# Patient Record
(demographics unavailable — no encounter records)

---

## 2025-02-02 NOTE — HISTORY OF PRESENT ILLNESS
Quality 110: Preventive Care And Screening: Influenza Immunization: Influenza Immunization Administered during Influenza season [Patient reported PAP Smear was normal] : Patient reported PAP Smear was normal Detail Level: Detailed [HIV Test offered] : HIV Test offered [Syphilis test offered] : Syphilis test offered [Gonorrhea test offered] : Gonorrhea test offered [Chlamydia test offered] : Chlamydia test offered [Trichomonas test offered] : Trichomonas test offered [HPV test offered] : HPV test offered [Hepatitis B test offered] : Hepatitis B test offered [Hepatitis C test offered] : Hepatitis C test offered [Y] : Patient is sexually active [Localized] : localized [Currently Active] : currently active [Men] : men [Vaginal] : vaginal [No] : No [N] : Patient denies prior pregnancies [Oral Contraceptive] : uses oral contraception pills [Normal Amount/Duration] :  normal amount and duration [Regular Cycle Intervals] : periods have been regular [Menarche Age: ____] : age at menarche was [unfilled] [FreeTextEntry1] : LORETA KAISER is a 24 year y/o female G0 who presents for pelvic pain and associated with right groin mass. The mass has presented 4 times with the first time in November 2023. An ultrasound determined it to be a  cystic lesion with few internal septations and no internal vascularity which resolved a week and a half after onset on its own. Currently more painful to the touch and area has swelling. Pt reports 10 pound weight loss since beginning of summer, no chills or night sweats. [PapSmeardate] : 01/2022 [LMPDate] : 01/13/2024 [MensesFreq] : 4 [PGHxTotal] : 0 [Menses] : not menses [Deal] : not intercourse [Urination] : not urination [Bowel Movement] : not bowel movement [TextBox_7] : on bikini line  [TextBox_13] : started 5 days ago  [FreeTextEntry3] : contraceptive pills

## 2025-02-02 NOTE — HISTORY OF PRESENT ILLNESS
[Patient reported PAP Smear was normal] : Patient reported PAP Smear was normal [HIV Test offered] : HIV Test offered [Syphilis test offered] : Syphilis test offered [Gonorrhea test offered] : Gonorrhea test offered [Chlamydia test offered] : Chlamydia test offered [Trichomonas test offered] : Trichomonas test offered [HPV test offered] : HPV test offered [Hepatitis B test offered] : Hepatitis B test offered [Hepatitis C test offered] : Hepatitis C test offered [Y] : Patient is sexually active [Localized] : localized [Currently Active] : currently active [Men] : men [Vaginal] : vaginal [No] : No [N] : Patient denies prior pregnancies [Oral Contraceptive] : uses oral contraception pills [Normal Amount/Duration] :  normal amount and duration [Regular Cycle Intervals] : periods have been regular [Menarche Age: ____] : age at menarche was [unfilled] [FreeTextEntry1] : LORETA KAISER is a 24 year y/o female G0 who presents for pelvic pain and associated with right groin mass. The mass has presented 4 times with the first time in November 2023. An ultrasound determined it to be a  cystic lesion with few internal septations and no internal vascularity which resolved a week and a half after onset on its own. Currently more painful to the touch and area has swelling. Pt reports 10 pound weight loss since beginning of summer, no chills or night sweats. [PapSmeardate] : 01/2022 [LMPDate] : 01/13/2024 [MensesFreq] : 4 [PGHxTotal] : 0 [Menses] : not menses [[place]] : not intercourse [Urination] : not urination [Bowel Movement] : not bowel movement [TextBox_7] : on bikini line  [TextBox_13] : started 5 days ago  [FreeTextEntry3] : contraceptive pills

## 2025-02-02 NOTE — PHYSICAL EXAM
[Appropriately responsive] : appropriately responsive [Alert] : alert [No Acute Distress] : no acute distress [Oriented x3] : oriented x3 [FreeTextEntry7] : RLQ inguinal 3cm deep, mobile tender mass. No erythema, skin changes, or drainage

## 2025-02-20 NOTE — HISTORY OF PRESENT ILLNESS
[FreeTextEntry1] : 24F referred by GYN for a symptomatic right canal of nuck cyst. Pt reports she has had this right inguinal swelling for more than year. States when it initially started she would have swelling and pain and would resolve w/in a week. Wouldn't get another flare for a few months. Now reports it wax and wanes on a weekly basis. Last time it was really swollen was 1 week ago. Today it has reduced but does feel tenderness to area and now experiencing some localized numbness. Patient has tried warm and cold compress but has not seen any relief.   PMH: Denies Meds: OCPs All: NKDA PSH: [place] teeth removal FH: Patient is adopted, unaware of family hx.  Denies OB hx Cscope:  Never been done  CT Pelvis w/ and w/o contrast 02/07/2025 Impression: 3.2 x 2.4 x 2.1 cm cyst at the right canal of Nuck.

## 2025-02-20 NOTE — PHYSICAL EXAM
[JVD] : no jugular venous distention  [Normal Breath Sounds] : Normal breath sounds [No Rash or Lesion] : No rash or lesion [Alert] : alert [Calm] : calm [de-identified] : Soft, nontender, nondistended. Right groin bulge without overlying skin changes. Mildly tender to touch without fluctuance. Bulging with valsalva.  [de-identified] : Well appearing thin female in NAD [de-identified] : MMM [de-identified] : ROM WNL

## 2025-03-03 NOTE — HISTORY OF PRESENT ILLNESS
[de-identified] : Patient is a 24 year old woman who presents for evaluation of symptomatic right cyst of canal of Nuck. She reports several month history of intermittent right groin pain associated with bulge. She underwent imaging that demonstrated of right sided Canal of nuck cyst. Denies any obstructive or obstipation symptoms. No fevers, chills, chest pain, dyspnea, dysuria, hematochezia, melena. At time of evaluation, afebrile, hemodynamically stable, abdomen soft, nontender, nondistended, right groin bulge appreciated consistent with canal of Nuck cyst.

## 2025-03-03 NOTE — ASSESSMENT
[FreeTextEntry1] : Patient is a 24 year old woman who presents for evaluation of symptomatic right cyst of canal of Nuck. She reports several month history of intermittent right groin pain associated with bulge. She underwent imaging that demonstrated of right sided Canal of nuck cyst. Denies any obstructive or obstipation symptoms. No fevers, chills, chest pain, dyspnea, dysuria, hematochezia, melena. At time of evaluation, afebrile, hemodynamically stable, abdomen soft, nontender, nondistended, right groin bulge appreciated consistent with canal of Nuck cyst.  In summary, Ms. LORETA KAISER has a symptomatic inguinal hernia for which I recommend surgical repair at the patient's earliest convenience. I discussed the risks and benefits including, but not limited to, injury to intra-abdominal organs (bowel, bladder, nerves, spermatic cord/round ligament), bleeding, infection, use of mesh and recurrent hernia, and urinary retention. We discussed the role and complications of mesh at length. I also discussed the normal navi- and post-operative course and the possibility of postoperative chronic pain. I answered all questions about this surgery and possible complications to the best of my ability and the patient verbalized understanding. Should unrelenting symptoms or signs of incarceration develop prior to surgery the patient knows to call or go to the emergency room.  Plan for robotic assisted right inguinal hernia repair with mesh, canal of Nuck cyst  I, Dr. Ian Paez, spent 50 minutes with the patient >50% counseling/coordination of care including, reviewing the patient's history, performing an examination, reviewing relevant labs and radiographic imaging, reviewing PCP and consultant notes, discussion of medical and surgical management of the diagnosis as well as associated risks and benefits, and completing documentation.

## 2025-03-30 NOTE — END OF VISIT
[] : Resident [FreeTextEntry3] : Healthy 23y/o woman. Exercises regularly and no anginal symptoms. She is considered low risk for a low/intermediate risk procedure.

## 2025-03-30 NOTE — END OF VISIT
[] : Resident [FreeTextEntry3] : Healthy 25y/o woman. Exercises regularly and no anginal symptoms. She is considered low risk for a low/intermediate risk procedure.

## 2025-03-30 NOTE — HISTORY OF PRESENT ILLNESS
[No Pertinent Cardiac History] : no history of aortic stenosis, atrial fibrillation, coronary artery disease, recent myocardial infarction, or implantable device/pacemaker [No Pertinent Pulmonary History] : no history of asthma, COPD, sleep apnea, or smoking [No Adverse Anesthesia Reaction] : no adverse anesthesia reaction in self or family member [(Patient denies any chest pain, claudication, dyspnea on exertion, orthopnea, palpitations or syncope)] : Patient denies any chest pain, claudication, dyspnea on exertion, orthopnea, palpitations or syncope [Good (7-10 METs)] : Good (7-10 METs) [Chronic Anticoagulation] : no chronic anticoagulation [Chronic Kidney Disease] : no chronic kidney disease [Diabetes] : no diabetes [FreeTextEntry1] : [facility]- RIGHT INGUINAL HERNIA REPAIR WITH MESH [FreeTextEntry2] : 4/10/25 [FreeTextEntry3] : Dr. Paez [FreeTextEntry4] : Ms. LORETA KAISER is a 24-year-old female with history of Lichen sclerosis and right canal of nuck cyst presenting today to the [facility] prior to robotic inguinal hernia repair with mesh that is scheduled with Dr. Paez on 4/10/25 at 8AM. Pt reports pain to R inguinal region due to cyst, pain has worsened within the last week and pt was unable to sleep last night. Has been taking Tylenol 1g BID and Ibuprofen 400mg BID with minimal relief of symptoms. [FreeTextEntry8] : Rock climbing 3x/week

## 2025-03-30 NOTE — ASSESSMENT
[Patient Optimized for Surgery] : Patient optimized for surgery [FreeTextEntry4] : Ms. LORETA KAISER is a 24-year-old female with history of Lichen sclerosus and right canal of nuck cyst presenting today to the [facility] prior to robotic inguinal hernia repair with mesh that is scheduled with Dr. Paez on 4/10/25 at 8AM. METS score 7-10. EKG with NSR, no acute ischemic changes noted. Pt is deemed low risk for a low-moderate risk procedure. Pending lab results.

## 2025-03-30 NOTE — ASSESSMENT
[Patient Optimized for Surgery] : Patient optimized for surgery [FreeTextEntry4] : Ms. LORETA KAISER is a 24-year-old female with history of Lichen sclerosus and right canal of nuck cyst presenting today to the [facility] prior to robotic inguinal hernia repair with mesh that is scheduled with Dr. Paez on 4/10/25 at 8AM. METS score 7-10. EKG with NSR, no acute ischemic changes noted. Pt is deemed low risk for a low-moderate risk procedure. Pending lab results.

## 2025-03-30 NOTE — PHYSICAL EXAM
[No Acute Distress] : no acute distress [Well Nourished] : well nourished [Well Developed] : well developed [PERRL] : pupils equal round and reactive to light [EOMI] : extraocular movements intact [Normal Outer Ear/Nose] : the outer ears and nose were normal in appearance [Normal Oropharynx] : the oropharynx was normal [Supple] : supple [No Respiratory Distress] : no respiratory distress  [No Accessory Muscle Use] : no accessory muscle use [Clear to Auscultation] : lungs were clear to auscultation bilaterally [Normal Rate] : normal rate  [Regular Rhythm] : with a regular rhythm [Normal S1, S2] : normal S1 and S2 [Soft] : abdomen soft [Non Tender] : non-tender [Non-distended] : non-distended [Normal Bowel Sounds] : normal bowel sounds [No Joint Swelling] : no joint swelling [Grossly Normal Strength/Tone] : grossly normal strength/tone [No Focal Deficits] : no focal deficits [Normal Gait] : normal gait [de-identified] : + R inguinal cyst noted with tenderness

## 2025-03-30 NOTE — REVIEW OF SYSTEMS
[Nasal Discharge] : nasal discharge [Fever] : no fever [Chills] : no chills [Fatigue] : no fatigue [Discharge] : no discharge [Pain] : no pain [Vision Problems] : no vision problems [Earache] : no earache [Sore Throat] : no sore throat [Postnasal Drip] : no postnasal drip [Palpitations] : no palpitations [Lower Ext Edema] : no lower extremity edema [Shortness Of Breath] : no shortness of breath [Wheezing] : no wheezing [Cough] : no cough [Abdominal Pain] : no abdominal pain [Nausea] : no nausea [Diarrhea] : diarrhea [Vomiting] : no vomiting [Melena] : no melena [Dysuria] : no dysuria [Hematuria] : no hematuria [Frequency] : no frequency [Headache] : no headache [Dizziness] : no dizziness

## 2025-03-30 NOTE — PHYSICAL EXAM
[No Acute Distress] : no acute distress [Well Nourished] : well nourished [Well Developed] : well developed [PERRL] : pupils equal round and reactive to light [EOMI] : extraocular movements intact [Normal Outer Ear/Nose] : the outer ears and nose were normal in appearance [Normal Oropharynx] : the oropharynx was normal [Supple] : supple [No Respiratory Distress] : no respiratory distress  [No Accessory Muscle Use] : no accessory muscle use [Clear to Auscultation] : lungs were clear to auscultation bilaterally [Normal Rate] : normal rate  [Regular Rhythm] : with a regular rhythm [Normal S1, S2] : normal S1 and S2 [Soft] : abdomen soft [Non Tender] : non-tender [Non-distended] : non-distended [Normal Bowel Sounds] : normal bowel sounds [No Joint Swelling] : no joint swelling [Grossly Normal Strength/Tone] : grossly normal strength/tone [No Focal Deficits] : no focal deficits [Normal Gait] : normal gait [de-identified] : + R inguinal cyst noted with tenderness

## 2025-03-30 NOTE — HISTORY OF PRESENT ILLNESS
[No Pertinent Cardiac History] : no history of aortic stenosis, atrial fibrillation, coronary artery disease, recent myocardial infarction, or implantable device/pacemaker [No Pertinent Pulmonary History] : no history of asthma, COPD, sleep apnea, or smoking [No Adverse Anesthesia Reaction] : no adverse anesthesia reaction in self or family member [(Patient denies any chest pain, claudication, dyspnea on exertion, orthopnea, palpitations or syncope)] : Patient denies any chest pain, claudication, dyspnea on exertion, orthopnea, palpitations or syncope [Good (7-10 METs)] : Good (7-10 METs) [Chronic Anticoagulation] : no chronic anticoagulation [Chronic Kidney Disease] : no chronic kidney disease [Diabetes] : no diabetes [FreeTextEntry2] : 4/10/25 [FreeTextEntry1] : [facility]- RIGHT INGUINAL HERNIA REPAIR WITH MESH [FreeTextEntry3] : Dr. Paez [FreeTextEntry4] : Ms. LORETA KAISER is a 24-year-old female with history of Lichen sclerosis and right canal of nuck cyst presenting today to the [facility] prior to robotic inguinal hernia repair with mesh that is scheduled with Dr. Paez on 4/10/25 at 8AM. Pt reports pain to R inguinal region due to cyst, pain has worsened within the last week and pt was unable to sleep last night. Has been taking Tylenol 1g BID and Ibuprofen 400mg BID with minimal relief of symptoms. [FreeTextEntry8] : Rock climbing 3x/week

## 2025-04-21 NOTE — PHYSICAL EXAM
[de-identified] : incisions healing well, dermabond intact. no evidence of bleeding, oozing, or infection

## 2025-04-21 NOTE — HISTORY OF PRESENT ILLNESS
[de-identified] : Pt is a 23 y/o F 2 weeks s/p repair of right inguinal hernia for treatment of right sided cyst of canal of nuck who presents today feeling well here for post op care.  Pt denies any fevers, chest pn, sob, calf pain, n,v,c,d, or abd pn since sx. Pt notes right groin numbness which she states she also experienced preop. Will reassess post op status again in 1 mo. Pt reports otherwise resuming her usual diet and home activities without difficulty and has been walking daily for exercise. Pt understands we do not recommend lifting over 10 lbs x 6 weeks post op.

## 2025-04-21 NOTE — ASSESSMENT
[FreeTextEntry1] : Pt is a 23 y/o F 2 weeks s/p repair of right inguinal hernia for treatment of right sided cyst of canal of nuck who presents today feeling well here for post op care.  Pt denies any fevers, chest pn, sob, calf pain, n,v,c,d, or abd pn since sx. Pt notes right groin numbness which she states she also experienced preop. Will reassess post op status again in 1 mo. Pt reports otherwise resuming her usual diet and home activities without difficulty and has been walking daily for exercise. Pt understands we do not recommend lifting over 10 lbs x 6 weeks post op.  I, Dr. Ian Paez, personally performed the evaluation and management (E/M) services for this patient. That E/M includes conducting the clinically appropriate initial history &/or exam, assessing all conditions, and establishing the plan of care. Today, my PA, Rosie Byrne, was present during my evaluation and management service for this patient and assisted in scribing the encounter and was here to observe my E/M service for this patient and follow plan of care established by me going forward.

## 2025-05-19 NOTE — HISTORY OF PRESENT ILLNESS
[de-identified] : Pt is a 23 y/o F 6 weeks s/p right inguinal hernia repair for cyst of canal of nuck who presents today feeling well here for post op care.  Pt denies any fevers, chest pn, sob, calf pain, n,v,c,d, or abd pn since sx. Pt reports feeling much better overall and her pain has completely resolved. Pt states she has resumed her usual diet and home activities and pt understands she no longer has any lifting restrictions after sx.

## 2025-05-19 NOTE — ASSESSMENT
[FreeTextEntry1] : Pt is a 25 y/o F 6 weeks s/p right inguinal hernia repair for cyst of canal of nuck who presents today feeling well here for post op care.  Pt denies any fevers, chest pn, sob, calf pain, n,v,c,d, or abd pn since sx. Pt reports feeling much better overall and her pain has completely resolved. Pt states she has resumed her usual diet and home activities and pt understands she no longer has any lifting restrictions after sx.   I, Dr. Ian Paez personally performed the evaluation and management (E/M) services for this patient. That E/M includes conducting the clinically appropriate initial history &/or exam, assessing all conditions, and establishing the plan of care. Today, my PA, Rosie Byrne, was present during my evaluation and management service for this patient and assisted in scribing the encounter and was here to observe my E/M service for this patient and follow plan of care established by me going forward.